# Patient Record
Sex: FEMALE | Race: WHITE | NOT HISPANIC OR LATINO | Employment: STUDENT | URBAN - METROPOLITAN AREA
[De-identification: names, ages, dates, MRNs, and addresses within clinical notes are randomized per-mention and may not be internally consistent; named-entity substitution may affect disease eponyms.]

---

## 2019-11-15 ENCOUNTER — OFFICE VISIT (OUTPATIENT)
Dept: INTERNAL MEDICINE | Facility: CLINIC | Age: 19
End: 2019-11-15

## 2019-11-15 VITALS
BODY MASS INDEX: 22.33 KG/M2 | SYSTOLIC BLOOD PRESSURE: 102 MMHG | HEART RATE: 75 BPM | WEIGHT: 134 LBS | OXYGEN SATURATION: 100 % | HEIGHT: 65 IN | DIASTOLIC BLOOD PRESSURE: 60 MMHG

## 2019-11-15 DIAGNOSIS — F33.0 MILD EPISODE OF RECURRENT MAJOR DEPRESSIVE DISORDER (HCC): ICD-10-CM

## 2019-11-15 DIAGNOSIS — F41.1 GENERALIZED ANXIETY DISORDER: Primary | ICD-10-CM

## 2019-11-15 DIAGNOSIS — Z23 NEED FOR VACCINATION: ICD-10-CM

## 2019-11-15 PROCEDURE — 90471 IMMUNIZATION ADMIN: CPT | Performed by: NURSE PRACTITIONER

## 2019-11-15 PROCEDURE — 99203 OFFICE O/P NEW LOW 30 MIN: CPT | Performed by: NURSE PRACTITIONER

## 2019-11-15 PROCEDURE — 90686 IIV4 VACC NO PRSV 0.5 ML IM: CPT | Performed by: NURSE PRACTITIONER

## 2019-11-15 RX ORDER — PAROXETINE 10 MG/1
10 TABLET, FILM COATED ORAL NIGHTLY
Qty: 30 TABLET | Refills: 0 | Status: SHIPPED | OUTPATIENT
Start: 2019-11-15 | End: 2019-12-16 | Stop reason: SDUPTHER

## 2019-11-15 RX ORDER — HYDROXYZINE HYDROCHLORIDE 25 MG/1
25 TABLET, FILM COATED ORAL 3 TIMES DAILY PRN
Qty: 30 TABLET | Refills: 0 | Status: SHIPPED | OUTPATIENT
Start: 2019-11-15 | End: 2020-08-26 | Stop reason: SDUPTHER

## 2019-11-15 NOTE — PROGRESS NOTES
Chief Complaint   Patient presents with   • Establish Care   • Anxiety   • Depression       History of Present Illness  19 y.o.female presents for establish care new pt and anxiety/depression.  Anxiety depression onset years.  Has taken prozac in past became really nauseated and stopped taking. Has had recent recurrent worsening sx since this summer. Nervous anxious easily upset. Some sadness.  No difficulty sleeping. Sometimes feels like having a panic attack with chest heaviness.  Has been going to counseling but not helping much. Feels like more anxiety than depression. No suicidal thoughts. College student a lot of stress.     Review of Systems   Constitutional: Negative for chills and fever.   Respiratory: Positive for chest tightness. Negative for shortness of breath.    Cardiovascular: Negative for chest pain, palpitations and leg swelling.   Gastrointestinal: Negative for abdominal pain, nausea and vomiting.   Genitourinary: Negative for difficulty urinating.   Neurological: Negative for weakness, numbness and headache.   Psychiatric/Behavioral: Positive for depressed mood and stress. Negative for self-injury, sleep disturbance and suicidal ideas. The patient is nervous/anxious.          [facility]  The following portions of the patient's history were reviewed and updated as appropriate: allergies, current medications, past family history, past medical history, past social history, past surgical history and problem list.     Social hx:  Tobacco none  Alcohol no  Past Medical History:   Diagnosis Date   • Anxiety    • Depression       Past Surgical History:   Procedure Laterality Date   • WISDOM TOOTH EXTRACTION        No Known Allergies   Family History   Problem Relation Age of Onset   • Mental illness Mother    • Mental illness Maternal Grandmother    • Heart attack Maternal Grandfather             Current Outpatient Medications:   •  Drospirenone-Ethinyl Estradiol (JESUS PO), Take  by mouth., Disp: , Rfl:  "    VITALS:  /60   Pulse 75   Ht 165.1 cm (65\")   Wt 60.8 kg (134 lb)   SpO2 100%   BMI 22.30 kg/m²     Physical Exam   Constitutional: She is oriented to person, place, and time. She appears well-developed and well-nourished. No distress.   HENT:   Head: Normocephalic.   Mouth/Throat: Oropharynx is clear and moist.   Eyes: Pupils are equal, round, and reactive to light.   Neck: Normal range of motion. Neck supple. No thyroid mass and no thyromegaly present.   Cardiovascular: Normal rate, regular rhythm and normal heart sounds.   Pulmonary/Chest: Effort normal and breath sounds normal. No respiratory distress.   Lymphadenopathy:     She has no cervical adenopathy.   Neurological: She is alert and oriented to person, place, and time.   Skin: Skin is warm and dry.   Psychiatric: Her speech is normal and behavior is normal. Her mood appears anxious. Cognition and memory are normal. She does not exhibit a depressed mood. She is attentive.   Vitals reviewed.      LABS  No new labs    ASSESSMENT/PLAN  Penelope was seen today for establish care, anxiety and depression.    Diagnoses and all orders for this visit:    Generalized anxiety disorder  -     PARoxetine (PAXIL) 10 MG tablet; Take 1 tablet by mouth Every Night.  -     hydrOXYzine (ATARAX) 25 MG tablet; Take 1 tablet by mouth 3 (Three) Times a Day As Needed for Anxiety.    Mild episode of recurrent major depressive disorder (CMS/HCC)  -     PARoxetine (PAXIL) 10 MG tablet; Take 1 tablet by mouth Every Night.    Need for vaccination  -     Flucelvax Quad=>4Years ([id number])    Counseled patient regarding multimodal approach with healthy nutrition, healthy sleep, regular physical activity, social activities, counseling, and medications.  Reviewed medication options and common side effects. Patient would like to proceed with paxil with hydroxyzine to use prn while waiting for paxil to reach theraputic levels.  Advised some antidepression meds can take " several weeks to see improvement in symptoms, and is important to take medication as prescribed.  Some antidepressants especially in younger populations can worsen depression symptoms.  If any thoughts of self harm or suicidal ideations, pt should contact our office for immediate evaluation or seek emergency care.    I discussed the patients findings and my recommendations with patient.  Patient was encouraged to keep me informed of any acute changes, lack of improvement, or any new concerning symptoms.    Patient voiced understanding of all instructions and denied further questions.      FOLLOW-UP  Return in about 4 weeks (around 12/13/2019), or if symptoms worsen or fail to improve, for Recheck.    Electronically signed by:    JEANNINE Elias  11/15/2019

## 2019-11-15 NOTE — PATIENT INSTRUCTIONS
Pt asked for General medication info for different anxiety/depressant medications:    Hydroxyzine antihistamine; can take every 8 hrs as needed for anxiety; sleepiness side effect. Good for anxiety  Buspar: 2-3 times daily; have to take regularly. Good for anxiety    SSRI anxiety and depression  prozac morning  paxil sleepy take at night  lexapro somulence

## 2019-12-11 DIAGNOSIS — F41.1 GENERALIZED ANXIETY DISORDER: ICD-10-CM

## 2019-12-11 DIAGNOSIS — F33.0 MILD EPISODE OF RECURRENT MAJOR DEPRESSIVE DISORDER (HCC): ICD-10-CM

## 2019-12-12 DIAGNOSIS — F41.1 GENERALIZED ANXIETY DISORDER: ICD-10-CM

## 2019-12-12 DIAGNOSIS — F33.0 MILD EPISODE OF RECURRENT MAJOR DEPRESSIVE DISORDER (HCC): ICD-10-CM

## 2019-12-12 RX ORDER — PAROXETINE 10 MG/1
10 TABLET, FILM COATED ORAL NIGHTLY
Qty: 30 TABLET | Refills: 0 | OUTPATIENT
Start: 2019-12-12

## 2019-12-12 RX ORDER — PAROXETINE 10 MG/1
TABLET, FILM COATED ORAL
Qty: 30 TABLET | Refills: 0 | OUTPATIENT
Start: 2019-12-12

## 2019-12-13 RX ORDER — PAROXETINE 10 MG/1
TABLET, FILM COATED ORAL
Qty: 30 TABLET | Refills: 0 | OUTPATIENT
Start: 2019-12-13

## 2019-12-14 DIAGNOSIS — F33.0 MILD EPISODE OF RECURRENT MAJOR DEPRESSIVE DISORDER (HCC): ICD-10-CM

## 2019-12-14 DIAGNOSIS — F41.1 GENERALIZED ANXIETY DISORDER: ICD-10-CM

## 2019-12-16 DIAGNOSIS — F41.1 GENERALIZED ANXIETY DISORDER: ICD-10-CM

## 2019-12-16 DIAGNOSIS — F33.0 MILD EPISODE OF RECURRENT MAJOR DEPRESSIVE DISORDER (HCC): ICD-10-CM

## 2019-12-16 RX ORDER — PAROXETINE 10 MG/1
10 TABLET, FILM COATED ORAL NIGHTLY
Qty: 3 TABLET | Refills: 0 | Status: SHIPPED | OUTPATIENT
Start: 2019-12-16 | End: 2019-12-19 | Stop reason: SDUPTHER

## 2019-12-16 RX ORDER — PAROXETINE 10 MG/1
10 TABLET, FILM COATED ORAL NIGHTLY
Qty: 30 TABLET | Refills: 0 | OUTPATIENT
Start: 2019-12-16

## 2019-12-19 ENCOUNTER — OFFICE VISIT (OUTPATIENT)
Dept: INTERNAL MEDICINE | Facility: CLINIC | Age: 19
End: 2019-12-19

## 2019-12-19 VITALS
DIASTOLIC BLOOD PRESSURE: 70 MMHG | HEIGHT: 65 IN | HEART RATE: 71 BPM | OXYGEN SATURATION: 100 % | SYSTOLIC BLOOD PRESSURE: 102 MMHG | WEIGHT: 134 LBS | BODY MASS INDEX: 22.33 KG/M2

## 2019-12-19 DIAGNOSIS — F41.1 GENERALIZED ANXIETY DISORDER: ICD-10-CM

## 2019-12-19 PROCEDURE — 99213 OFFICE O/P EST LOW 20 MIN: CPT | Performed by: NURSE PRACTITIONER

## 2019-12-19 RX ORDER — PAROXETINE 10 MG/1
10 TABLET, FILM COATED ORAL NIGHTLY
Qty: 30 TABLET | Refills: 11 | Status: SHIPPED | OUTPATIENT
Start: 2019-12-19 | End: 2020-01-06

## 2019-12-19 NOTE — PROGRESS NOTES
"Chief Complaint   Patient presents with   • Anxiety       History of Present Illness  19 y.o.female presents for anxiety follow up.  Doing well on paxil; likes the med. Feels less anxious. Sleeping better. No side effects. No depression. Has only had to take the hydroxyzine a couple times to help sleep. Onset current anxiety flare few months; started on paxil Nov 11th.    Review of Systems   Constitutional: Negative for chills and fever.   Cardiovascular: Negative for chest pain and palpitations.   Psychiatric/Behavioral: Negative for dysphoric mood, self-injury, sleep disturbance and suicidal ideas. The patient is not nervous/anxious.          [facility]  The following portions of the patient's history were reviewed and updated as appropriate: allergies, current medications, past family history, past medical history, past social history, past surgical history and problem list.       Past Medical History:   Diagnosis Date   • Anxiety    • Depression       Past Surgical History:   Procedure Laterality Date   • WISDOM TOOTH EXTRACTION        No Known Allergies   Family History   Problem Relation Age of Onset   • Mental illness Mother    • Mental illness Maternal Grandmother    • Heart attack Maternal Grandfather             Current Outpatient Medications:   •  Drospirenone-Ethinyl Estradiol (JESUS PO), Take  by mouth., Disp: , Rfl:   •  hydrOXYzine (ATARAX) 25 MG tablet, Take 1 tablet by mouth 3 (Three) Times a Day As Needed for Anxiety., Disp: 30 tablet, Rfl: 0  •  PARoxetine (PAXIL) 10 MG tablet, Take 1 tablet by mouth Every Night., Disp: 3 tablet, Rfl: 0    VITALS:  /70   Pulse 71   Ht 165.1 cm (65\")   Wt 60.8 kg (134 lb)   SpO2 100%   BMI 22.30 kg/m²     Physical Exam   Constitutional: She appears well-developed and well-nourished.   Eyes: Pupils are equal, round, and reactive to light.   Pulmonary/Chest: Effort normal.   Neurological: She is alert.   Psychiatric: She has a normal mood and affect. Her " behavior is normal.   Vitals reviewed.      LABS  No new labs    ASSESSMENT/PLAN  Penelope was seen today for anxiety.    Diagnoses and all orders for this visit:    Generalized anxiety disorder  -     PARoxetine (PAXIL) 10 MG tablet; Take 1 tablet by mouth Every Night.    Encouraged healthy nutrition sleep physical activities.  Explained if having to take hydroxyzine still could be that we need to increase paxil; she wants to cont on current dose.  doing well on current med.  Any thoughts of self hair or suicidal ideations to contact me or seek emergency services.    I discussed the patients findings and my recommendations with patient.  Patient was encouraged to keep me informed of any acute changes, lack of improvement, or any new concerning symptoms.    Patient voiced understanding of all instructions and denied further questions.      FOLLOW-UP  Return in about 1 year (around 12/19/2020), or if symptoms worsen or fail to improve, for Annual.    Electronically signed by:    JEANNINE Elias  12/19/2019

## 2020-01-06 RX ORDER — PAROXETINE HYDROCHLORIDE 20 MG/1
20 TABLET, FILM COATED ORAL
Qty: 30 TABLET | Refills: 0 | Status: SHIPPED | OUTPATIENT
Start: 2020-01-06 | End: 2020-02-02 | Stop reason: SDUPTHER

## 2020-02-03 RX ORDER — PAROXETINE HYDROCHLORIDE 20 MG/1
20 TABLET, FILM COATED ORAL
Qty: 12 TABLET | Refills: 0 | Status: SHIPPED | OUTPATIENT
Start: 2020-02-03 | End: 2020-02-13 | Stop reason: SDUPTHER

## 2020-02-03 RX ORDER — PAROXETINE HYDROCHLORIDE 20 MG/1
20 TABLET, FILM COATED ORAL
Qty: 30 TABLET | Refills: 0 | OUTPATIENT
Start: 2020-02-03

## 2020-02-03 NOTE — TELEPHONE ENCOUNTER
Tiera from pharmacy called to check quantity for the following medication:PARoxetine (PAXIL) 20 MG tablet.  [phone]

## 2020-02-13 ENCOUNTER — OFFICE VISIT (OUTPATIENT)
Dept: INTERNAL MEDICINE | Facility: CLINIC | Age: 20
End: 2020-02-13

## 2020-02-13 VITALS
BODY MASS INDEX: 22.33 KG/M2 | HEIGHT: 65 IN | OXYGEN SATURATION: 98 % | DIASTOLIC BLOOD PRESSURE: 60 MMHG | SYSTOLIC BLOOD PRESSURE: 110 MMHG | WEIGHT: 134 LBS | HEART RATE: 69 BPM

## 2020-02-13 DIAGNOSIS — F32.A ANXIETY AND DEPRESSION: Primary | ICD-10-CM

## 2020-02-13 DIAGNOSIS — F41.9 ANXIETY AND DEPRESSION: Primary | ICD-10-CM

## 2020-02-13 PROCEDURE — 99213 OFFICE O/P EST LOW 20 MIN: CPT | Performed by: NURSE PRACTITIONER

## 2020-02-13 RX ORDER — PAROXETINE HYDROCHLORIDE 20 MG/1
20 TABLET, FILM COATED ORAL
Qty: 90 TABLET | Refills: 5 | Status: SHIPPED | OUTPATIENT
Start: 2020-02-13 | End: 2020-05-07 | Stop reason: SDUPTHER

## 2020-02-13 NOTE — PROGRESS NOTES
Chief Complaint   Patient presents with   • Anxiety       History of Present Illness  19 y.o.female presents for anxiety 4 week follow up to evaluate medication adjustment.    Anxiety   Presents for follow-up visit. Patient reports no compulsions, confusion, decreased concentration, depressed mood, dizziness, dry mouth, excessive worry, feeling of choking, hyperventilation, irritability, nausea, nervous/anxious behavior, palpitations or panic. Primary symptoms comment: Symptoms are improved significantly; only mild . Symptoms occur rarely. The severity of symptoms is mild. The quality of sleep is good. Nighttime awakenings: none.     Compliance with medications is %.         Review of Systems   Constitutional: Negative for irritability.   Cardiovascular: Negative for palpitations.   Gastrointestinal: Negative for nausea.   Neurological: Negative for dizziness and confusion.   Psychiatric/Behavioral: Negative for decreased concentration and depressed mood. The patient is not nervous/anxious.          [facility]  The following portions of the patient's history were reviewed and updated as appropriate: allergies, current medications, past family history, past medical history, past social history, past surgical history and problem list.     Past Medical History:   Diagnosis Date   • Anxiety    • Depression       Past Surgical History:   Procedure Laterality Date   • WISDOM TOOTH EXTRACTION        No Known Allergies   Social History     Socioeconomic History   • Marital status: Single     Spouse name: Not on file   • Number of children: Not on file   • Years of education: Not on file   • Highest education level: Not on file   Tobacco Use   • Smoking status: Never Smoker   • Smokeless tobacco: Never Used   Substance and Sexual Activity   • Alcohol use: Yes   • Drug use: No   • Sexual activity: Yes     Partners: Male     Birth control/protection: OCP     Family History   Problem Relation Age of Onset   • Mental illness  "Mother    • Mental illness Maternal Grandmother    • Heart attack Maternal Grandfather             Current Outpatient Medications:   •  Drospirenone-Ethinyl Estradiol (JESUS PO), Take  by mouth., Disp: , Rfl:   •  hydrOXYzine (ATARAX) 25 MG tablet, Take 1 tablet by mouth 3 (Three) Times a Day As Needed for Anxiety., Disp: 30 tablet, Rfl: 0  •  PARoxetine (PAXIL) 20 MG tablet, Take 1 tablet by mouth every night at bedtime., Disp: 12 tablet, Rfl: 0    VITALS:  /60   Pulse 69   Ht 165.1 cm (65\")   Wt 60.8 kg (134 lb)   SpO2 98%   BMI 22.30 kg/m²     Physical Exam   Constitutional: She appears well-developed and well-nourished. No distress.   HENT:   Head: Normocephalic and atraumatic.   Cardiovascular: Normal rate, regular rhythm and normal heart sounds.   Pulmonary/Chest: Effort normal and breath sounds normal. No respiratory distress.   Skin: Skin is warm and dry.   Psychiatric: She has a normal mood and affect. Her behavior is normal.       LABS  No results found for this or any previous visit.    ASSESSMENT/PLAN  Penelope was seen today for anxiety.    Diagnoses and all orders for this visit:    Anxiety and depression  Comments:  Continue taking meds as RX'd. If any thoughts of self harm or SI seek emergency care.   Orders:  -     PARoxetine (PAXIL) 20 MG tablet; Take 1 tablet by mouth every night at bedtime.    on birth control.  Discussed if wanting to pursue pregnancy she needs to notify me so we could discuss other med options.    I discussed the patients findings and my recommendations with patient.  Patient was encouraged to keep me informed of any acute changes, lack of improvement, or any new concerning symptoms.  Patient voiced understanding of all instructions and denied further questions.      FOLLOW-UP  Return in about 6 months (around 8/13/2020) for Annual.    Electronically signed by:    JEANNINE Elias  02/13/2020    EMR Dragon/Transcription Disclaimer:  Much of this encounter " note is an electronic transcription/translation of spoken language to printed text.  The electronic translation of spoken language may permit erroneous, or at times, nonsensical words or phrases to be inadvertently transcribed.  Although I have reviewed the note for such errors, some may still exist

## 2020-05-07 DIAGNOSIS — F32.A ANXIETY AND DEPRESSION: ICD-10-CM

## 2020-05-07 DIAGNOSIS — F41.9 ANXIETY AND DEPRESSION: ICD-10-CM

## 2020-05-08 RX ORDER — PAROXETINE HYDROCHLORIDE 20 MG/1
20 TABLET, FILM COATED ORAL
Qty: 90 TABLET | Refills: 1 | Status: SHIPPED | OUTPATIENT
Start: 2020-05-08 | End: 2020-09-05 | Stop reason: SDUPTHER

## 2020-08-03 DIAGNOSIS — F41.9 ANXIETY AND DEPRESSION: ICD-10-CM

## 2020-08-03 DIAGNOSIS — F32.A ANXIETY AND DEPRESSION: ICD-10-CM

## 2020-08-03 DIAGNOSIS — F41.1 GENERALIZED ANXIETY DISORDER: ICD-10-CM

## 2020-08-03 RX ORDER — PAROXETINE HYDROCHLORIDE 20 MG/1
20 TABLET, FILM COATED ORAL
Qty: 90 TABLET | Refills: 1 | OUTPATIENT
Start: 2020-08-03

## 2020-08-03 RX ORDER — HYDROXYZINE HYDROCHLORIDE 25 MG/1
25 TABLET, FILM COATED ORAL 3 TIMES DAILY PRN
Qty: 30 TABLET | Refills: 0 | OUTPATIENT
Start: 2020-08-03

## 2020-08-26 ENCOUNTER — OFFICE VISIT (OUTPATIENT)
Dept: INTERNAL MEDICINE | Facility: CLINIC | Age: 20
End: 2020-08-26

## 2020-08-26 VITALS
OXYGEN SATURATION: 98 % | HEART RATE: 66 BPM | DIASTOLIC BLOOD PRESSURE: 78 MMHG | SYSTOLIC BLOOD PRESSURE: 120 MMHG | WEIGHT: 138.4 LBS | BODY MASS INDEX: 23.03 KG/M2

## 2020-08-26 DIAGNOSIS — F41.1 GENERALIZED ANXIETY DISORDER: ICD-10-CM

## 2020-08-26 DIAGNOSIS — Z30.41 ENCOUNTER FOR SURVEILLANCE OF CONTRACEPTIVE PILLS: ICD-10-CM

## 2020-08-26 DIAGNOSIS — Z00.00 ANNUAL PHYSICAL EXAM: Primary | ICD-10-CM

## 2020-08-26 DIAGNOSIS — E04.9 ENLARGED THYROID: ICD-10-CM

## 2020-08-26 PROCEDURE — 99395 PREV VISIT EST AGE 18-39: CPT | Performed by: NURSE PRACTITIONER

## 2020-08-26 RX ORDER — HYDROXYZINE HYDROCHLORIDE 25 MG/1
25 TABLET, FILM COATED ORAL 3 TIMES DAILY PRN
Qty: 30 TABLET | Refills: 11 | Status: SHIPPED | OUTPATIENT
Start: 2020-08-26

## 2020-08-26 RX ORDER — DROSPIRENONE AND ETHINYL ESTRADIOL 0.02-3(28)
1 KIT ORAL DAILY
Qty: 28 TABLET | Refills: 12 | Status: SHIPPED | OUTPATIENT
Start: 2020-08-26 | End: 2021-09-17

## 2020-08-26 NOTE — PROGRESS NOTES
Chief Complaint   Patient presents with   • Annual Exam       History of Present Illness  19 y.o.female presents for health maintanence, Adult Female:   General Health: The patient's health is described as good. She has regular dental visits. She denies vision problems. She denies hearing loss. Immunizations status reviewed and plan to update today if needed.  Chronic medical problems:  General anxiety chronic onset months takes paxil and hydroxyzine without difficulties; sx controlled.  Contraception: takes zachary li bc. Without difficulties.  Non smoker. No hx dvt. No migraines.  Social History/Lifestyle: She is single.   She has never smoked. She reports never drinking alcohol. Denies any illicit drug use.  She consumes a diverse and healthy diet. She does not have any weight concerns. She exercises regularly.    Reproductive health:  She reports normal menses. No vaginal pain, vaginal drainage, pelvic pain, flank pain, or dysuria.  No breast complaints.  Screening:   Health Maintenance reviewed.  Health Maintenance   Topic Date Due   • HPV VACCINES (1 - Female 2-dose series) 11/05/2010   • HEPATITIS C SCREENING  11/11/2019   • CHLAMYDIA SCREENING  11/11/2019   • INFLUENZA VACCINE  08/01/2020   • ANNUAL PHYSICAL  02/14/2021   • TDAP/TD VACCINES (2 - Td) 05/17/2021   • MENINGOCOCCAL VACCINE (Normal Risk)  Completed          Review of Systems   Constitutional: Negative for chills and fatigue.   HENT: Negative for congestion, postnasal drip and rhinorrhea.    Eyes: Negative for blurred vision and visual disturbance.   Respiratory: Negative for cough and shortness of breath.    Cardiovascular: Negative for chest pain, palpitations and leg swelling.   Gastrointestinal: Negative for constipation, diarrhea, nausea and vomiting.   Genitourinary: Negative for difficulty urinating.   Musculoskeletal: Negative for arthralgias.   Skin: Negative for rash.   Neurological: Negative for dizziness and headache.    Psychiatric/Behavioral: Negative for depressed mood. The patient is not nervous/anxious.          [facility]  The following portions of the patient's history were reviewed and updated as appropriate: allergies, current medications, past family history, past medical history, past social history, past surgical history and problem list.     Past Medical History:   Diagnosis Date   • Anxiety    • Depression       Past Surgical History:   Procedure Laterality Date   • WISDOM TOOTH EXTRACTION        No Known Allergies   Family History   Problem Relation Age of Onset   • Mental illness Mother    • Mental illness Maternal Grandmother    • Heart attack Maternal Grandfather       Social History     Socioeconomic History   • Marital status: Single   Tobacco Use   • Smoking status: Never Smoker   • Smokeless tobacco: Never Used   Substance and Sexual Activity   • Alcohol use: Yes   • Drug use: No   • Sexual activity: Yes     Partners: Male     Birth control/protection: OCP         Current Outpatient Medications:   •  Drospirenone-Ethinyl Estradiol (JESUS PO), Take  by mouth., Disp: , Rfl:   •  hydrOXYzine (ATARAX) 25 MG tablet, Take 1 tablet by mouth 3 (Three) Times a Day As Needed for Anxiety., Disp: 30 tablet, Rfl: 0  •  PARoxetine (Paxil) 20 MG tablet, Take 1 tablet by mouth every night at bedtime., Disp: 90 tablet, Rfl: 1    VITALS:  /78   Pulse 66   Wt 62.8 kg (138 lb 6.4 oz)   SpO2 98%   Breastfeeding No   BMI 23.03 kg/m²     Physical Exam   Constitutional: She is oriented to person, place, and time. She appears well-developed and well-nourished. No distress.   HENT:   Head: Normocephalic.   Right Ear: External ear normal.   Left Ear: External ear normal.   Nose: Nose normal.   Mouth/Throat: Oropharynx is clear and moist.   Eyes: Pupils are equal, round, and reactive to light. Conjunctivae and EOM are normal. Right eye exhibits no discharge. Left eye exhibits no discharge.   Neck: Normal range of motion. Neck  supple. Thyromegaly present.   Thyroid gland enlarged bilaterally smooth nontender   Cardiovascular: Normal rate, regular rhythm, normal heart sounds and intact distal pulses.   No edema   Pulmonary/Chest: Effort normal and breath sounds normal. No respiratory distress.   Abdominal: Soft. Bowel sounds are normal. There is no tenderness.   Musculoskeletal: Normal range of motion.   Normal ROM all major joints   Lymphadenopathy:     She has no cervical adenopathy.   Neurological: She is alert and oriented to person, place, and time.   Skin: Skin is warm and dry. Capillary refill takes less than 2 seconds. No rash noted.   Psychiatric: She has a normal mood and affect. Her behavior is normal.   Vitals reviewed.      LABS  pending    ASSESSMENT/PLAN  Penelope was seen today for annual exam.    Diagnoses and all orders for this visit:    Annual physical exam  Comments:  pt declined other screening labwork  Orders:  -     Chlamydia trachomatis, PCR - Urine, Urine, Clean Catch; Future    Generalized anxiety disorder  -     hydrOXYzine (ATARAX) 25 MG tablet; Take 1 tablet by mouth 3 (Three) Times a Day As Needed for Anxiety.    Encounter for surveillance of contraceptive pills  -     drospirenone-ethinyl estradiol (Gisselle) 3-0.02 MG per tablet; Take 1 tablet by mouth Daily.    Enlarged thyroid  -     TSH; Future  -     T4, free; Future  -     Thyroid Peroxidase Antibody; Future        Nutrition and activity goals reviewed including: mainly water to drink, limit white flour/processed sugar; increase high protein, high fiber carbs, good breakfast, working toward 150 mins cardio per week, resistance training 2x/week.    The patient is here for a health maintenance visit.  Currently, the patient consumes a healthy diet and has an adequate exercise regimen. Screening lab work is ordered.  Immunizations are reported as current.  Advice and education is given regarding nutrition, aerobic exercise, routine dental evaluations,  routine eye exams, reproductive health, cardiovascular risk reduction, sunscreen use, and seat belt use (general overall safety).  Further recommendations after lab evaluation.  Annual wellness evaluations recommended.     I discussed the patients findings and my recommendations with patient.  Patient was encouraged to keep me informed of any acute changes or any new concerning symptoms.    Patient voiced understanding of all instructions and denied further questions.      FOLLOW-UP  Return in about 1 year (around 8/26/2021), or if symptoms worsen or fail to improve, for Annual.    Electronically signed by:    JEANNINE Elias  08/26/2020    EMR Dragon/Transcription Disclaimer:  Much of this encounter note is an electronic transcription/translation of spoken language to printed text.  The electronic translation of spoken language may permit erroneous, or at times, nonsensical words or phrases to be inadvertently transcribed.  Although I have reviewed the note for such errors, some may still exist

## 2020-08-27 ENCOUNTER — LAB (OUTPATIENT)
Dept: LAB | Facility: HOSPITAL | Age: 20
End: 2020-08-27

## 2020-08-27 DIAGNOSIS — Z00.00 ANNUAL PHYSICAL EXAM: ICD-10-CM

## 2020-08-27 PROCEDURE — 87491 CHLMYD TRACH DNA AMP PROBE: CPT | Performed by: NURSE PRACTITIONER

## 2020-08-29 LAB — C TRACH RRNA SPEC DONR QL NAA+PROBE: NEGATIVE

## 2020-09-01 ENCOUNTER — TELEPHONE (OUTPATIENT)
Dept: INTERNAL MEDICINE | Facility: CLINIC | Age: 20
End: 2020-09-01

## 2020-09-01 NOTE — TELEPHONE ENCOUNTER
I called pt and LM to return my call. HUB please transfer to office staff to see if I am available; in office today Tuesday.      ----- Message from Carmenza Zavala sent at 8/31/2020  8:13 AM EDT -----  Regarding: FW: Prescription Question  Contact: [phone]      ----- Message -----  From: Penelope Chavez  Sent: 8/30/2020  10:23 AM EDT  To: [facility] [place] [facility]  Subject: Prescription Question                            I have been feeling a little more down than usual and have been considering going up to 40 mg of Paxil if that's okay with you

## 2020-09-02 NOTE — TELEPHONE ENCOUNTER
Patient returned phone call. I let her know Kamilah wasn't in the office today but I will send a message back to see if anyone else could help her.     You can reach her at [phone]   [place]

## 2020-09-05 ENCOUNTER — OFFICE VISIT (OUTPATIENT)
Dept: INTERNAL MEDICINE | Facility: CLINIC | Age: 20
End: 2020-09-05

## 2020-09-05 DIAGNOSIS — F33.0 MILD EPISODE OF RECURRENT MAJOR DEPRESSIVE DISORDER (HCC): ICD-10-CM

## 2020-09-05 PROCEDURE — 99442 PR PHYS/QHP TELEPHONE EVALUATION 11-20 MIN: CPT | Performed by: NURSE PRACTITIONER

## 2020-09-05 RX ORDER — PAROXETINE HYDROCHLORIDE 20 MG/1
30 TABLET, FILM COATED ORAL
Qty: 135 TABLET | Refills: 1 | Status: SHIPPED | OUTPATIENT
Start: 2020-09-05 | End: 2021-04-16 | Stop reason: SDUPTHER

## 2020-09-05 NOTE — PROGRESS NOTES
Chief Complaint   Patient presents with   • Depression     wants to discuss dosage change,Paxil       History of Present Illness  19 y.o.female presents for depression.  You have chosen to receive care through a telephone visit. Do you consent to use a telephone visit for your medical care today? Yes      Depression   Visit Type: follow-up  Patient presents with the following symptoms: depressed mood.  Patient is not experiencing: feelings of hopelessness, feelings of worthlessness, insomnia, nervousness/anxiety, suicidal ideas, suicidal planning and thoughts of death.  Episode frequency: not as bad as when initially started tx, but has been feeling more down lately with everything going on.   Severity: mild   Sleep quality: good  Compliance with medications:  % (takes paxil; would like to increase dose.)              Review of Systems   Constitutional: Negative for fatigue.   Psychiatric/Behavioral: Positive for depressed mood. Negative for self-injury, sleep disturbance and suicidal ideas. The patient is not nervous/anxious and does not have insomnia.          [facility]  The following portions of the patient's history were reviewed and updated as appropriate: allergies, current medications, past family history, past medical history, past social history, past surgical history and problem list.     Past Medical History:   Diagnosis Date   • Anxiety    • Depression       Past Surgical History:   Procedure Laterality Date   • WISDOM TOOTH EXTRACTION        No Known Allergies   Social History     Socioeconomic History   • Marital status: Single   Tobacco Use   • Smoking status: Never Smoker   • Smokeless tobacco: Never Used   Substance and Sexual Activity   • Alcohol use: Yes   • Drug use: No   • Sexual activity: Yes     Partners: Male     Birth control/protection: OCP     Family History   Problem Relation Age of Onset   • Mental illness Mother    • Mental illness Maternal Grandmother    • Heart attack Maternal  Grandfather             Current Outpatient Medications:   •  drospirenone-ethinyl estradiol (Gisselle) 3-0.02 MG per tablet, Take 1 tablet by mouth Daily., Disp: 28 tablet, Rfl: 12  •  hydrOXYzine (ATARAX) 25 MG tablet, Take 1 tablet by mouth 3 (Three) Times a Day As Needed for Anxiety., Disp: 30 tablet, Rfl: 11  •  PARoxetine (Paxil) 20 MG tablet, Take 1 tablet by mouth every night at bedtime., Disp: 90 tablet, Rfl: 1    VITALS:  Physical Exam  Telephone visit  Conversant speech clear appropriate  LABS  No new labs      ASSESSMENT/PLAN  Penelope was seen today for depression.    Diagnoses and all orders for this visit:    Mild episode of recurrent major depressive disorder (CMS/HCC)  Comments:  increased paxil   Orders:  -     PARoxetine (Paxil) 20 MG tablet; Take 1.5 tablets by mouth every night at bedtime.    Depression plan  Diagnostics to rule out other conditions TSH, urine drug screen, EKGs before initiating tricyclic antidepressants  Vitamin D and B12 and folate levels.  Non-pharmacologic management  Establish safe environment ensure patient safety and least restrictive environment.  Psychotherapy nonpharmacologic management  SSRIs or SNRIs, tricyclic antidepressants  In 2 weeks or sooner.  Antidepressant medications continued for at least 4 to 6 months after complete remission of symptoms.  Taper instead of abruptly discontinuing.    Counseled patient regarding multimodal approach with healthy nutrition, healthy sleep, regular physical activity, social activities, counseling, and medications.  Reviewed medication options and common side effects. Patient would like to proceed with increasing paxil.  Advised some antidepression meds can take several weeks to see improvement in symptoms, and is important to take medication as prescribed.  Some antidepressants especially in younger populations can worsen depression symptoms.  If any thoughts of self harm or suicidal ideations, pt should contact our office for  immediate evaluation or seek emergency care.      I discussed the patients findings and my recommendations with patient.  Patient was encouraged to keep me informed of any acute changes, lack of improvement, or any new concerning symptoms.  Patient voiced understanding of all instructions and denied further questions.  This visit has been rescheduled as a phone visit to comply with patient safety concerns in accordance with CDC recommendations. Total time of discussion was 12 minutes.      FOLLOW-UP  Return in about 3 months (around 12/5/2020), or if symptoms worsen or fail to improve, for Recheck.    Electronically signed by:    JEANNINE Elias  09/05/2020    EMR Dragon/Transcription Disclaimer:  Much of this encounter note is an electronic transcription/translation of spoken language to printed text.  The electronic translation of spoken language may permit erroneous, or at times, nonsensical words or phrases to be inadvertently transcribed.  Although I have reviewed the note for such errors, some may still exist

## 2020-12-29 DIAGNOSIS — F33.0 MILD EPISODE OF RECURRENT MAJOR DEPRESSIVE DISORDER (HCC): ICD-10-CM

## 2020-12-30 DIAGNOSIS — F33.0 MILD EPISODE OF RECURRENT MAJOR DEPRESSIVE DISORDER (HCC): ICD-10-CM

## 2020-12-30 RX ORDER — PAROXETINE HYDROCHLORIDE 20 MG/1
30 TABLET, FILM COATED ORAL
Qty: 135 TABLET | Refills: 1 | OUTPATIENT
Start: 2020-12-30

## 2020-12-30 NOTE — TELEPHONE ENCOUNTER
"HUB to read \"Pt to contact pharmacy, should still have rf available, sent on 9/5/20 for 6 month supply\"  "

## 2021-03-22 ENCOUNTER — PATIENT MESSAGE (OUTPATIENT)
Dept: INTERNAL MEDICINE | Facility: CLINIC | Age: 21
End: 2021-03-22

## 2021-03-23 ENCOUNTER — TELEPHONE (OUTPATIENT)
Dept: INTERNAL MEDICINE | Facility: CLINIC | Age: 21
End: 2021-03-23

## 2021-03-23 NOTE — TELEPHONE ENCOUNTER
----- Message from Penelope Chavez sent at 3/23/2021  4:17 PM EDT -----  Regarding: RE: Appt  Contact: [phone]  What number do I call?     ----- Message -----  From: ROSE PATEL  Sent: 3/22/21, 1:01 PM  To: Penelopegeovani Chavez  Subject: Appt    Ms. Chavez  Please call and schedule a video visit or telephone visit w/ Kamilah to discuss further. Thank you

## 2021-03-25 ENCOUNTER — OFFICE VISIT (OUTPATIENT)
Dept: INTERNAL MEDICINE | Facility: CLINIC | Age: 21
End: 2021-03-25

## 2021-03-25 DIAGNOSIS — Z30.09 BIRTH CONTROL COUNSELING: Primary | ICD-10-CM

## 2021-03-25 PROCEDURE — 99443 PR PHYS/QHP TELEPHONE EVALUATION 21-30 MIN: CPT | Performed by: NURSE PRACTITIONER

## 2021-03-25 NOTE — PROGRESS NOTES
Chief Complaint   Patient presents with   • Contraception     Pt to discuss birth control     You have chosen to receive care through a telephone visit. Do you consent to use a telephone visit for your medical care today? Yes    History of Present Illness    20 y.o.female presents for contraception couneling.  On pill since high school was on accutane. Her friends report feeling better off bc. Would like to discuss more natural ways for bc so can take less medication. Would like to see if helped anx/depression.     ROS: any positive referenced above.   Other ROS negative.      [facility]  The following portions of the patient's history were reviewed and updated as appropriate: allergies, current medications, past family history, past medical history, past social history, past surgical history and problem list.     Past Medical History:   Diagnosis Date   • Anxiety    • Depression       No Known Allergies   Social History     Tobacco Use   • Smoking status: Never Smoker   • Smokeless tobacco: Never Used   Substance Use Topics   • Alcohol use: Yes   • Drug use: No         Current Outpatient Medications:   •  drospirenone-ethinyl estradiol (Gisselle) 3-0.02 MG per tablet, Take 1 tablet by mouth Daily., Disp: 28 tablet, Rfl: 12  •  hydrOXYzine (ATARAX) 25 MG tablet, Take 1 tablet by mouth 3 (Three) Times a Day As Needed for Anxiety., Disp: 30 tablet, Rfl: 11  •  PARoxetine (Paxil) 20 MG tablet, Take 1.5 tablets by mouth every night at bedtime., Disp: 135 tablet, Rfl: 1    Physical Exam  telehealth    Assessment and Plan    Diagnoses and all orders for this visit:    1. Birth control counseling (Primary)    discussed different forms risks benefits bc; lower dose NELLI, POP, injections, implants, inserts, and condoms. Pt would like to continue to think about options and will let me know if any questions or if needs change in prescriptions. No further questions.      Follow Up   No follow-ups on file.    This visit has been  rescheduled as a phone visit to comply with patient safety concerns in accordance with CDC recommendations. Total time of discussion was 21 minutes.        I discussed the patients findings and my recommendations with patient.  Patient was encouraged to keep me informed of any acute changes, lack of improvement, or any new concerning symptoms.  Patient voiced understanding of all instructions and denied further questions.    Electronically signed by:    JEANNINE Elias  03/25/2021    EMR Dragon/Transcription Disclaimer:  Much of this encounter note is an electronic transcription/translation of spoken language to printed text.  The electronic translation of spoken language may permit erroneous, or at times, nonsensical words or phrases to be inadvertently transcribed.  Although I have reviewed the note for such errors, some may still exist

## 2021-04-16 ENCOUNTER — TELEMEDICINE (OUTPATIENT)
Dept: INTERNAL MEDICINE | Facility: CLINIC | Age: 21
End: 2021-04-16

## 2021-04-16 DIAGNOSIS — F33.0 MILD EPISODE OF RECURRENT MAJOR DEPRESSIVE DISORDER (HCC): Primary | ICD-10-CM

## 2021-04-16 DIAGNOSIS — F41.9 ANXIETY: ICD-10-CM

## 2021-04-16 PROCEDURE — 99213 OFFICE O/P EST LOW 20 MIN: CPT | Performed by: NURSE PRACTITIONER

## 2021-04-16 RX ORDER — PAROXETINE HYDROCHLORIDE 40 MG/1
40 TABLET, FILM COATED ORAL
Qty: 30 TABLET | Refills: 3 | Status: SHIPPED | OUTPATIENT
Start: 2021-04-16 | End: 2021-07-10 | Stop reason: SDUPTHER

## 2021-04-16 NOTE — PROGRESS NOTES
Chief Complaint   Patient presents with   • Depression     The use of a video visit has been reviewed with the patient and verbal informed consent has been obtained.      History of Present Illness    20 y.o.female presents for depression follow-up.    Depression  Visit Type: follow-up  Patient presents with the following symptoms: depressed mood, nervousness/anxiety and panic.  Patient is not experiencing: feelings of hopelessness, feelings of worthlessness, hypersomnia, insomnia, shortness of breath, suicidal ideas and thoughts of death.  Frequency of symptoms: occasionally   Severity: mild   Sleep quality: good      Intermittent anxiety chronic onset months recurrent.  Takes occasional hydroxyzine for anxiety and does well for that.  Patient would like to try increasing her Paxil dose to see if further helps her intermittent depression problem.    Review of Systems   Constitutional: Negative for chills, fatigue and fever.   Respiratory: Negative for shortness of breath.    Cardiovascular: Negative for chest pain.   Gastrointestinal: Negative for nausea.   Neurological: Negative for dizziness and light-headedness.   Psychiatric/Behavioral: Positive for depressed mood. Negative for self-injury, sleep disturbance and suicidal ideas. The patient is nervous/anxious. The patient does not have insomnia.            [facility]  The following portions of the patient's history were reviewed and updated as appropriate: allergies, current medications, past family history, past medical history, past social history, past surgical history and problem list.     Past Medical History:   Diagnosis Date   • Anxiety    • Depression       No Known Allergies   Social History     Tobacco Use   • Smoking status: Never Smoker   • Smokeless tobacco: Never Used   Substance Use Topics   • Alcohol use: Yes   • Drug use: No         Current Outpatient Medications:   •  drospirenone-ethinyl estradiol (Gisselle) 3-0.02 MG per tablet, Take 1 tablet by  mouth Daily., Disp: 28 tablet, Rfl: 12  •  hydrOXYzine (ATARAX) 25 MG tablet, Take 1 tablet by mouth 3 (Three) Times a Day As Needed for Anxiety., Disp: 30 tablet, Rfl: 11  •  PARoxetine (Paxil) 20 MG tablet, Take 1.5 tablets by mouth every night at bedtime., Disp: 135 tablet, Rfl: 1      Physical Exam  HENT:      Head: Normocephalic.   Pulmonary:      Effort: Pulmonary effort is normal.   Neurological:      General: No focal deficit present.      Mental Status: She is alert and oriented to person, place, and time.   Psychiatric:         Mood and Affect: Mood normal.         Behavior: Behavior normal.      Comments: talkative           Assessment and Plan    Diagnoses and all orders for this visit:    1. Mild episode of recurrent major depressive disorder (CMS/HCC) (Primary)  Comments:  increased paxil   Orders:  -     PARoxetine (Paxil) 40 MG tablet; Take 1 tablet by mouth every night at bedtime.  Dispense: 30 tablet; Refill: 3    2. Anxiety  Comments:  Continue Paxil and as needed hydroxyzine.          Follow Up   Return in about 2 months (around 6/16/2021), or if symptoms worsen or fail to improve, for Recheck.      I discussed the patients findings and my recommendations with patient.  Patient was encouraged to keep me informed of any acute changes, lack of improvement, or any new concerning symptoms.  Patient voiced understanding of all instructions and denied further questions.    Electronically signed by:    JEANNINE Elias  04/16/2021    EMR Dragon/Transcription Disclaimer:  Much of this encounter note is an electronic transcription/translation of spoken language to printed text.  The electronic translation of spoken language may permit erroneous, or at times, nonsensical words or phrases to be inadvertently transcribed.  Although I have reviewed the note for such errors, some may still exist

## 2021-07-10 ENCOUNTER — OFFICE VISIT (OUTPATIENT)
Dept: INTERNAL MEDICINE | Facility: CLINIC | Age: 21
End: 2021-07-10

## 2021-07-10 VITALS
OXYGEN SATURATION: 100 % | WEIGHT: 142.8 LBS | SYSTOLIC BLOOD PRESSURE: 108 MMHG | DIASTOLIC BLOOD PRESSURE: 82 MMHG | HEIGHT: 65 IN | BODY MASS INDEX: 23.79 KG/M2 | HEART RATE: 80 BPM | TEMPERATURE: 97.6 F

## 2021-07-10 DIAGNOSIS — R41.840 ATTENTION DEFICIT: Primary | ICD-10-CM

## 2021-07-10 DIAGNOSIS — F33.0 MILD EPISODE OF RECURRENT MAJOR DEPRESSIVE DISORDER (HCC): ICD-10-CM

## 2021-07-10 PROCEDURE — 99214 OFFICE O/P EST MOD 30 MIN: CPT | Performed by: NURSE PRACTITIONER

## 2021-07-10 RX ORDER — PAROXETINE HYDROCHLORIDE 40 MG/1
40 TABLET, FILM COATED ORAL
Qty: 90 TABLET | Refills: 3 | Status: SHIPPED | OUTPATIENT
Start: 2021-07-10 | End: 2022-06-23 | Stop reason: SDUPTHER

## 2021-07-10 RX ORDER — ALBUTEROL SULFATE 90 UG/1
AEROSOL, METERED RESPIRATORY (INHALATION)
COMMUNITY
Start: 2021-05-02 | End: 2021-09-17

## 2021-07-10 RX ORDER — ATOMOXETINE 40 MG/1
CAPSULE ORAL
Qty: 90 CAPSULE | Refills: 0 | Status: SHIPPED | OUTPATIENT
Start: 2021-07-10 | End: 2021-09-17 | Stop reason: SDUPTHER

## 2021-07-10 NOTE — PROGRESS NOTES
Chief Complaint   Patient presents with   • ADHD     wants to discuss med to help focus more       History of Present Illness    20 y.o.female presents for attention deficit. Wants to discuss med to help focus more. Is on paxil for depression; improved symptoms but still not able to focus well. Has difficulty taking tests and other life activities. Has never had to take any ADD meds before.    Depression chronic; onset > 1 year. taking paxil. Improved symptoms tolerating well.    Review of Systems   Constitutional: Negative for fatigue.   Psychiatric/Behavioral: Positive for decreased concentration. Negative for depressed mood. The patient is not nervous/anxious.        [facility]  The following portions of the patient's history were reviewed and updated as appropriate: allergies, current medications, past family history, past medical history, past social history, past surgical history and problem list.     Past Medical History:   Diagnosis Date   • Anxiety    • Depression       No Known Allergies   Social History     Tobacco Use   • Smoking status: Never Smoker   • Smokeless tobacco: Never Used   Vaping Use   • Vaping Use: Never used   Substance Use Topics   • Alcohol use: Yes   • Drug use: No     Past Surgical History:   Procedure Laterality Date   • WISDOM TOOTH EXTRACTION        Family History   Problem Relation Age of Onset   • Mental illness Mother    • Mental illness Maternal Grandmother    • Heart attack Maternal Grandfather            Current Outpatient Medications:   •  hydrOXYzine (ATARAX) 25 MG tablet, Take 1 tablet by mouth 3 (Three) Times a Day As Needed for Anxiety., Disp: 30 tablet, Rfl: 11  •  PARoxetine (Paxil) 40 MG tablet, Take 1 tablet by mouth every night at bedtime., Disp: 30 tablet, Rfl: 3  •  albuterol sulfate  (90 Base) MCG/ACT inhaler, INHALE 2 PUFFS BY MOUTH EVERY 4 TO 6 HOURS AS NEEDED FOR WHEEZING, Disp: , Rfl:   •  drospirenone-ethinyl estradiol (Gisselle) 3-0.02 MG per tablet, Take  "1 tablet by mouth Daily., Disp: 28 tablet, Rfl: 12    VITALS:  /82   Pulse 80   Temp 97.6 °F (36.4 °C)   Ht 165.1 cm (65\")   Wt 64.8 kg (142 lb 12.8 oz)   SpO2 100%   BMI 23.76 kg/m²     Physical Exam  Vitals reviewed.   Constitutional:       General: She is not in acute distress.     Appearance: She is well-developed.   HENT:      Head: Normocephalic.   Eyes:      Pupils: Pupils are equal, round, and reactive to light.   Cardiovascular:      Rate and Rhythm: Normal rate and regular rhythm.      Heart sounds: Normal heart sounds.      Comments: No edema  Pulmonary:      Effort: Pulmonary effort is normal. No respiratory distress.      Breath sounds: Normal breath sounds.   Musculoskeletal:      Cervical back: Normal range of motion.   Skin:     General: Skin is warm and dry.      Capillary Refill: Capillary refill takes less than 2 seconds.      Findings: No rash.   Neurological:      Mental Status: She is alert and oriented to person, place, and time.   Psychiatric:         Mood and Affect: Mood normal.         Behavior: Behavior normal.           Assessment and Plan    Diagnoses and all orders for this visit:    1. Attention deficit (Primary)  -     atomoxetine (STRATTERA) 40 MG capsule; Take 1 capsule by mouth daily in mornings for 1 month, then increase to 2 capsules daily in mornings. Need follow up appt at 8 weeks.  Dispense: 90 capsule; Refill: 0    2. Mild episode of recurrent major depressive disorder (CMS/HCC)  Comments:  cotn paxil   Orders:  -     PARoxetine (Paxil) 40 MG tablet; Take 1 tablet by mouth every night at bedtime.  Dispense: 90 tablet; Refill: 3        I discussed the patients findings and my recommendations with patient.  Patient was encouraged to keep me informed of any acute changes, lack of improvement, or any new concerning symptoms.  Patient voiced understanding of all instructions and denied further questions.      Follow Up   Return in about 8 weeks (around 9/4/2021), or " if symptoms worsen or fail to improve.      Electronically signed by:    JEANNINE Elias  07/10/2021

## 2021-09-16 DIAGNOSIS — R41.840 ATTENTION DEFICIT: ICD-10-CM

## 2021-09-16 DIAGNOSIS — F33.0 MILD EPISODE OF RECURRENT MAJOR DEPRESSIVE DISORDER (HCC): ICD-10-CM

## 2021-09-16 RX ORDER — ATOMOXETINE 40 MG/1
CAPSULE ORAL
Qty: 90 CAPSULE | Refills: 0 | Status: CANCELLED | OUTPATIENT
Start: 2021-09-16

## 2021-09-16 RX ORDER — PAROXETINE HYDROCHLORIDE 40 MG/1
40 TABLET, FILM COATED ORAL
Qty: 90 TABLET | Refills: 3 | OUTPATIENT
Start: 2021-09-16

## 2021-09-16 NOTE — TELEPHONE ENCOUNTER
Last Office Visit: 07/10/21  Next Office Visit: 09/17/21    Labs completed in past 6 months? no  Labs completed in past year? no    Strattera  Last Refill Date: 07/10/21  Quantity:90  Refills:0    Pa  Pharmacy:     Please review pended refill request for any changes needed on refills or quantities. Thank you!

## 2021-09-17 ENCOUNTER — OFFICE VISIT (OUTPATIENT)
Dept: INTERNAL MEDICINE | Facility: CLINIC | Age: 21
End: 2021-09-17

## 2021-09-17 VITALS
HEART RATE: 88 BPM | TEMPERATURE: 98.5 F | DIASTOLIC BLOOD PRESSURE: 86 MMHG | BODY MASS INDEX: 22.96 KG/M2 | OXYGEN SATURATION: 100 % | WEIGHT: 138 LBS | SYSTOLIC BLOOD PRESSURE: 130 MMHG

## 2021-09-17 DIAGNOSIS — R41.840 ATTENTION DEFICIT: ICD-10-CM

## 2021-09-17 PROCEDURE — 99213 OFFICE O/P EST LOW 20 MIN: CPT | Performed by: NURSE PRACTITIONER

## 2021-09-17 RX ORDER — ATOMOXETINE 80 MG/1
80 CAPSULE ORAL DAILY
Qty: 60 CAPSULE | Refills: 0 | Status: SHIPPED | OUTPATIENT
Start: 2021-09-17 | End: 2022-07-12

## 2021-09-17 RX ORDER — PAROXETINE HYDROCHLORIDE 40 MG/1
40 TABLET, FILM COATED ORAL
Qty: 90 TABLET | Refills: 3 | Status: CANCELLED | OUTPATIENT
Start: 2021-09-17

## 2021-09-17 RX ORDER — ATOMOXETINE 40 MG/1
CAPSULE ORAL
Qty: 90 CAPSULE | Refills: 0 | Status: CANCELLED | OUTPATIENT
Start: 2021-09-17

## 2021-09-21 NOTE — PROGRESS NOTES
Chief Complaint   Patient presents with   • Med Refill     check dosage on Strattera       History of Present Illness    20 y.o.female presents for fu ADD med refill.  Chronic attention deficit. Seen back in July; started on straterra. Is currently at 80 mg daily and is helping. Wants to stay on current dose.  Depression doing good on paxil.    Review of Systems   Constitutional: Negative for fatigue.   Respiratory: Negative for shortness of breath.    Cardiovascular: Negative for chest pain, palpitations and leg swelling.   Psychiatric/Behavioral: Positive for decreased concentration.         [facility]  The following portions of the patient's history were reviewed and updated as appropriate: allergies, current medications, past family history, past medical history, past social history, past surgical history and problem list.     Past Medical History:   Diagnosis Date   • Anxiety    • Depression       No Known Allergies   Social History     Tobacco Use   • Smoking status: Never Smoker   • Smokeless tobacco: Never Used   Vaping Use   • Vaping Use: Never used   Substance Use Topics   • Alcohol use: Yes   • Drug use: No     Past Surgical History:   Procedure Laterality Date   • WISDOM TOOTH EXTRACTION        Family History   Problem Relation Age of Onset   • Mental illness Mother    • Mental illness Maternal Grandmother    • Heart attack Maternal Grandfather            Current Outpatient Medications:   •  atomoxetine (STRATTERA) 80 MG capsule, Take 1 capsule by mouth Daily., Disp: 60 capsule, Rfl: 0  •  hydrOXYzine (ATARAX) 25 MG tablet, Take 1 tablet by mouth 3 (Three) Times a Day As Needed for Anxiety., Disp: 30 tablet, Rfl: 11  •  PARoxetine (Paxil) 40 MG tablet, Take 1 tablet by mouth every night at bedtime., Disp: 90 tablet, Rfl: 3    VITALS:  /86   Pulse 88   Temp 98.5 °F (36.9 °C)   Wt 62.6 kg (138 lb)   SpO2 100%   BMI 22.96 kg/m²     Physical Exam  HENT:      Head: Normocephalic.   Cardiovascular:       Rate and Rhythm: Normal rate and regular rhythm.      Heart sounds: Normal heart sounds.   Pulmonary:      Effort: Pulmonary effort is normal. No respiratory distress.      Breath sounds: Normal breath sounds.   Neurological:      Mental Status: She is alert and oriented to person, place, and time.   Psychiatric:         Mood and Affect: Mood normal.             Assessment and Plan    Diagnoses and all orders for this visit:    1. Attention deficit  -     atomoxetine (STRATTERA) 80 MG capsule; Take 1 capsule by mouth Daily.  Dispense: 60 capsule; Refill: 0      I discussed the patients findings and my recommendations with patient.  Patient was encouraged to keep me informed of any acute changes, lack of improvement, or any new concerning symptoms.  Patient voiced understanding of all instructions and denied further questions.      Follow Up {Instructions Charge Capture  Follow-up Communications :23}  Return in about 2 months (around 11/17/2021).      Electronically signed by:    JEANNINE Elias  09/17/2021

## 2021-11-12 ENCOUNTER — OFFICE VISIT (OUTPATIENT)
Dept: INTERNAL MEDICINE | Facility: CLINIC | Age: 21
End: 2021-11-12

## 2021-11-12 ENCOUNTER — LAB (OUTPATIENT)
Dept: LAB | Facility: HOSPITAL | Age: 21
End: 2021-11-12

## 2021-11-12 VITALS
WEIGHT: 139.6 LBS | BODY MASS INDEX: 23.23 KG/M2 | DIASTOLIC BLOOD PRESSURE: 80 MMHG | SYSTOLIC BLOOD PRESSURE: 122 MMHG | OXYGEN SATURATION: 99 % | HEART RATE: 122 BPM | TEMPERATURE: 97.8 F

## 2021-11-12 DIAGNOSIS — R00.0 TACHYCARDIA: ICD-10-CM

## 2021-11-12 DIAGNOSIS — R00.0 TACHYCARDIA: Primary | ICD-10-CM

## 2021-11-12 DIAGNOSIS — Z23 NEED FOR INFLUENZA VACCINATION: ICD-10-CM

## 2021-11-12 LAB
BASOPHILS # BLD AUTO: 0.06 10*3/MM3 (ref 0–0.2)
BASOPHILS NFR BLD AUTO: 1.1 % (ref 0–1.5)
DEPRECATED RDW RBC AUTO: 38.6 FL (ref 37–54)
EOSINOPHIL # BLD AUTO: 0.08 10*3/MM3 (ref 0–0.4)
EOSINOPHIL NFR BLD AUTO: 1.4 % (ref 0.3–6.2)
ERYTHROCYTE [DISTWIDTH] IN BLOOD BY AUTOMATED COUNT: 11.7 % (ref 12.3–15.4)
HCT VFR BLD AUTO: 41.4 % (ref 34–46.6)
HGB BLD-MCNC: 14.4 G/DL (ref 12–15.9)
IMM GRANULOCYTES # BLD AUTO: 0.01 10*3/MM3 (ref 0–0.05)
IMM GRANULOCYTES NFR BLD AUTO: 0.2 % (ref 0–0.5)
LYMPHOCYTES # BLD AUTO: 1.6 10*3/MM3 (ref 0.7–3.1)
LYMPHOCYTES NFR BLD AUTO: 29 % (ref 19.6–45.3)
MCH RBC QN AUTO: 31.6 PG (ref 26.6–33)
MCHC RBC AUTO-ENTMCNC: 34.8 G/DL (ref 31.5–35.7)
MCV RBC AUTO: 90.8 FL (ref 79–97)
MONOCYTES # BLD AUTO: 0.48 10*3/MM3 (ref 0.1–0.9)
MONOCYTES NFR BLD AUTO: 8.7 % (ref 5–12)
NEUTROPHILS NFR BLD AUTO: 3.29 10*3/MM3 (ref 1.7–7)
NEUTROPHILS NFR BLD AUTO: 59.6 % (ref 42.7–76)
NRBC BLD AUTO-RTO: 0 /100 WBC (ref 0–0.2)
PLATELET # BLD AUTO: 374 10*3/MM3 (ref 140–450)
PMV BLD AUTO: 10.3 FL (ref 6–12)
RBC # BLD AUTO: 4.56 10*6/MM3 (ref 3.77–5.28)
WBC # BLD AUTO: 5.52 10*3/MM3 (ref 3.4–10.8)

## 2021-11-12 PROCEDURE — 90686 IIV4 VACC NO PRSV 0.5 ML IM: CPT | Performed by: NURSE PRACTITIONER

## 2021-11-12 PROCEDURE — 80053 COMPREHEN METABOLIC PANEL: CPT

## 2021-11-12 PROCEDURE — 84443 ASSAY THYROID STIM HORMONE: CPT

## 2021-11-12 PROCEDURE — 85025 COMPLETE CBC W/AUTO DIFF WBC: CPT

## 2021-11-12 PROCEDURE — 99214 OFFICE O/P EST MOD 30 MIN: CPT | Performed by: NURSE PRACTITIONER

## 2021-11-12 PROCEDURE — 90471 IMMUNIZATION ADMIN: CPT | Performed by: NURSE PRACTITIONER

## 2021-11-12 NOTE — PROGRESS NOTES
Chief Complaint   Patient presents with   • Rapid Heart Rate       History of Present Illness    21 y.o.female presents for fast heart rate.  Over last couple weeks has noticed her heart rate runs high 110 -120's; even when resting. No chest pain. No palpitations or shortness of breath.       Review of Systems   Constitutional: Negative for chills and fever.   HENT: Negative.    Eyes: Negative for blurred vision and visual disturbance.   Respiratory: Negative for chest tightness, shortness of breath and wheezing.    Cardiovascular: Negative for chest pain, palpitations and leg swelling.        Fast heart rate   Genitourinary: Negative for difficulty urinating.   Neurological: Negative for dizziness, light-headedness and headache.         [facility]  The following portions of the patient's history were reviewed and updated as appropriate: allergies, current medications, past family history, past medical history, past social history, past surgical history and problem list.     Past Medical History:   Diagnosis Date   • Anxiety    • Depression       No Known Allergies   Social History     Tobacco Use   • Smoking status: Never Smoker   • Smokeless tobacco: Never Used   Vaping Use   • Vaping Use: Never used   Substance Use Topics   • Alcohol use: Yes   • Drug use: No     Past Surgical History:   Procedure Laterality Date   • WISDOM TOOTH EXTRACTION        Family History   Problem Relation Age of Onset   • Mental illness Mother    • Mental illness Maternal Grandmother    • Heart attack Maternal Grandfather            Current Outpatient Medications:   •  atomoxetine (STRATTERA) 80 MG capsule, Take 1 capsule by mouth Daily., Disp: 60 capsule, Rfl: 0  •  hydrOXYzine (ATARAX) 25 MG tablet, Take 1 tablet by mouth 3 (Three) Times a Day As Needed for Anxiety., Disp: 30 tablet, Rfl: 11  •  PARoxetine (Paxil) 40 MG tablet, Take 1 tablet by mouth every night at bedtime., Disp: 90 tablet, Rfl: 3    VITALS:  /80   Pulse (!) 122    Temp 97.8 °F (36.6 °C)   Wt 63.3 kg (139 lb 9.6 oz)   SpO2 99%   BMI 23.23 kg/m²     Physical Exam  Vitals reviewed.   Cardiovascular:      Rate and Rhythm: Tachycardia present.      Heart sounds: Normal heart sounds.   Pulmonary:      Effort: Pulmonary effort is normal. No respiratory distress.      Breath sounds: Normal breath sounds.   Musculoskeletal:      Right lower leg: No edema.      Left lower leg: No edema.   Skin:     General: Skin is dry.   Neurological:      General: No focal deficit present.      Mental Status: She is alert and oriented to person, place, and time.         Result Review :            Assessment and Plan    Diagnoses and all orders for this visit:    1. Tachycardia (Primary)  -     CBC & Differential; Future  -     Comprehensive Metabolic Panel; Future  -     TSH Rfx On Abnormal To Free T4; Future  Check labs. Discussed use of betablockers; pt prefers to not have to take other medication at this time.  Cut back on caffeine.  2. Need for influenza vaccination  -     FluLaval/Fluarix/Fluzone >6 Months        I discussed the patients findings and my recommendations with patient.  Patient was encouraged to keep me informed of any acute changes, lack of improvement, or any new concerning symptoms.  Patient voiced understanding of all instructions and denied further questions.      Follow Up   Return if symptoms worsen or fail to improve.      Electronically signed by:    JEANNINE Elias  11/12/2021

## 2021-11-13 LAB
ALBUMIN SERPL-MCNC: 4.8 G/DL (ref 3.5–5.2)
ALBUMIN/GLOB SERPL: 2 G/DL
ALP SERPL-CCNC: 86 U/L (ref 39–117)
ALT SERPL W P-5'-P-CCNC: 31 U/L (ref 1–33)
ANION GAP SERPL CALCULATED.3IONS-SCNC: 6.5 MMOL/L (ref 5–15)
AST SERPL-CCNC: 33 U/L (ref 1–32)
BILIRUB SERPL-MCNC: 0.3 MG/DL (ref 0–1.2)
BUN SERPL-MCNC: 9 MG/DL (ref 6–20)
BUN/CREAT SERPL: 10.8 (ref 7–25)
CALCIUM SPEC-SCNC: 9.5 MG/DL (ref 8.6–10.5)
CHLORIDE SERPL-SCNC: 102 MMOL/L (ref 98–107)
CO2 SERPL-SCNC: 27.5 MMOL/L (ref 22–29)
CREAT SERPL-MCNC: 0.83 MG/DL (ref 0.57–1)
GFR SERPL CREATININE-BSD FRML MDRD: 86 ML/MIN/1.73
GLOBULIN UR ELPH-MCNC: 2.4 GM/DL
GLUCOSE SERPL-MCNC: 100 MG/DL (ref 65–99)
POTASSIUM SERPL-SCNC: 4.8 MMOL/L (ref 3.5–5.2)
PROT SERPL-MCNC: 7.2 G/DL (ref 6–8.5)
SODIUM SERPL-SCNC: 136 MMOL/L (ref 136–145)
TSH SERPL DL<=0.05 MIU/L-ACNC: 1.94 UIU/ML (ref 0.27–4.2)

## 2021-11-17 ENCOUNTER — TELEPHONE (OUTPATIENT)
Dept: INTERNAL MEDICINE | Facility: CLINIC | Age: 21
End: 2021-11-17

## 2021-11-17 NOTE — TELEPHONE ENCOUNTER
PT'S PSYCHIATRIST, DR. TRAVIS RANDOLPH, CALLED IN REQUESTING THAT [facility] ORDER PT AN EKG BEFORE THEY CAN PUT HER IN ON A STIMULANT. PLEASE ADVISE.     CALLBACK # [phone]

## 2021-11-21 DIAGNOSIS — R00.0 TACHYCARDIA: Primary | ICD-10-CM

## 2021-11-21 DIAGNOSIS — Z51.81 MEDICATION MONITORING ENCOUNTER: ICD-10-CM

## 2021-11-22 NOTE — TELEPHONE ENCOUNTER
Caller: Penelope Chavez    Relationship to patient: Self    Best call back number: [phone]    Patient is needing: PATIENT CALLED BACK TO PROVIDE FAX NUMBER [phone]

## 2021-11-22 NOTE — TELEPHONE ENCOUNTER
"\"HUB TO READ\" PT CALLING WITH FAX INFORMATION  Called pt and she will call us back with info of where to send the order to have done at [facility] in [place].    "

## 2021-11-22 NOTE — TELEPHONE ENCOUNTER
Call pt. She can stop by for nurse visit to get ekg done. Order is in computer. If she is still having  - 120's, I would not do stimulant medication.

## 2022-06-23 DIAGNOSIS — F33.0 MILD EPISODE OF RECURRENT MAJOR DEPRESSIVE DISORDER: ICD-10-CM

## 2022-06-23 RX ORDER — PAROXETINE HYDROCHLORIDE 40 MG/1
TABLET, FILM COATED ORAL
Qty: 90 TABLET | Refills: 3 | OUTPATIENT
Start: 2022-06-23

## 2022-06-23 RX ORDER — PAROXETINE HYDROCHLORIDE 40 MG/1
40 TABLET, FILM COATED ORAL
Qty: 20 TABLET | Refills: 0 | Status: SHIPPED | OUTPATIENT
Start: 2022-06-23 | End: 2022-07-12 | Stop reason: SDUPTHER

## 2022-06-23 NOTE — TELEPHONE ENCOUNTER
Caller: Penelope Chavez    Relationship: Self    Best call back number:[phone] (H)    Requested Prescriptions:   Requested Prescriptions     Pending Prescriptions Disp Refills   • PARoxetine (Paxil) 40 MG tablet 90 tablet 3     Sig: Take 1 tablet by mouth every night at bedtime.        Pharmacy where request should be sent: [facility] - [address] [phone] [facility] [phone]      Additional details provided by patient: PATIENT HAS ONE TABLET LEFT OF MEDICATION- PATIENT IS REQUESTING A REFILL UNTIL HER APPOINTMENT IN July     Does the patient have less than a 3 day supply:  [x] Yes  [] No    Marlen Connelly Rep   06/23/22 15:58 EDT

## 2022-07-12 ENCOUNTER — OFFICE VISIT (OUTPATIENT)
Dept: INTERNAL MEDICINE | Facility: CLINIC | Age: 22
End: 2022-07-12

## 2022-07-12 VITALS
DIASTOLIC BLOOD PRESSURE: 76 MMHG | BODY MASS INDEX: 22.98 KG/M2 | OXYGEN SATURATION: 100 % | SYSTOLIC BLOOD PRESSURE: 110 MMHG | WEIGHT: 143 LBS | HEIGHT: 66 IN | HEART RATE: 82 BPM | TEMPERATURE: 97.9 F

## 2022-07-12 DIAGNOSIS — F41.1 GENERALIZED ANXIETY DISORDER: Primary | ICD-10-CM

## 2022-07-12 DIAGNOSIS — F33.42 RECURRENT MAJOR DEPRESSIVE DISORDER, IN FULL REMISSION: ICD-10-CM

## 2022-07-12 PROCEDURE — 99213 OFFICE O/P EST LOW 20 MIN: CPT | Performed by: NURSE PRACTITIONER

## 2022-07-12 RX ORDER — PAROXETINE HYDROCHLORIDE 40 MG/1
40 TABLET, FILM COATED ORAL
Qty: 90 TABLET | Refills: 3 | Status: SHIPPED | OUTPATIENT
Start: 2022-07-12 | End: 2022-10-19 | Stop reason: DRUGHIGH

## 2022-07-12 NOTE — PROGRESS NOTES
"  Chief Complaint   Patient presents with   • Anxiety   • Depression       History of Present Illness    21 y.o.female presents for anxiety depression follow up.  General anxiety chronic onset years. Depression chronic onset years. Takes paxil; controls both. Feels good right now on current med/dose. Has recently went thru a breakup, so wants to stay on med. Would consider coming off at some time. Rarely has to take hydroxyzine with sleep.    Review of Systems   Constitutional: Negative for fatigue.   Psychiatric/Behavioral: Negative for self-injury, sleep disturbance and depressed mood. The patient is not nervous/anxious.          [facility]  The following portions of the patient's history were reviewed and updated as appropriate: allergies, current medications, past family history, past medical history, past social history, past surgical history and problem list.     Past Medical History:   Diagnosis Date   • Anxiety    • Depression       No Known Allergies   Social History     Tobacco Use   • Smoking status: Never Smoker   • Smokeless tobacco: Never Used   Vaping Use   • Vaping Use: Never used   Substance Use Topics   • Alcohol use: Yes   • Drug use: No     Past Surgical History:   Procedure Laterality Date   • WISDOM TOOTH EXTRACTION        Family History   Problem Relation Age of Onset   • Mental illness Mother    • Mental illness Maternal Grandmother    • Heart attack Maternal Grandfather            Current Outpatient Medications:   •  atomoxetine (STRATTERA) 80 MG capsule, Take 1 capsule by mouth Daily., Disp: 60 capsule, Rfl: 0  •  hydrOXYzine (ATARAX) 25 MG tablet, Take 1 tablet by mouth 3 (Three) Times a Day As Needed for Anxiety., Disp: 30 tablet, Rfl: 11  •  PARoxetine (Paxil) 40 MG tablet, Take 1 tablet by mouth every night at bedtime., Disp: 20 tablet, Rfl: 0    VITALS:  /76   Pulse 82   Temp 97.9 °F (36.6 °C)   Ht 166.4 cm (65.5\")   Wt 64.9 kg (143 lb)   LMP 07/10/2022   SpO2 100%   BMI 23.43 " kg/m²     Physical Exam  Vitals reviewed.   HENT:      Mouth/Throat:      Mouth: Mucous membranes are moist.   Eyes:      Pupils: Pupils are equal, round, and reactive to light.   Cardiovascular:      Rate and Rhythm: Normal rate and regular rhythm.      Heart sounds: Normal heart sounds.   Pulmonary:      Effort: Pulmonary effort is normal. No respiratory distress.      Breath sounds: Normal breath sounds.   Skin:     General: Skin is warm and dry.   Neurological:      General: No focal deficit present.      Mental Status: She is alert and oriented to person, place, and time.      Motor: No weakness.      Gait: Gait normal.   Psychiatric:         Mood and Affect: Mood normal.         Behavior: Behavior normal.         Result Review :              Assessment and Plan    Diagnoses and all orders for this visit:    1. Generalized anxiety disorder (Primary)  -     PARoxetine (Paxil) 40 MG tablet; Take 1 tablet by mouth every night at bedtime.  Dispense: 90 tablet; Refill: 3    2. Recurrent major depressive disorder, in full remission (HCC)  Comments:  cont paxil   Orders:  -     PARoxetine (Paxil) 40 MG tablet; Take 1 tablet by mouth every night at bedtime.  Dispense: 90 tablet; Refill: 3        I discussed the patients findings and my recommendations with patient.  Patient was encouraged to keep me informed of any acute changes, lack of improvement, or any new concerning symptoms.  Patient voiced understanding of all instructions and denied further questions.      Follow Up   Return in about 1 year (around 7/12/2023), or if symptoms worsen or fail to improve, for Annual.      Electronically signed by:    JEANNINE Elias  07/12/2022

## 2022-07-15 DIAGNOSIS — F41.1 GENERALIZED ANXIETY DISORDER: ICD-10-CM

## 2022-07-15 DIAGNOSIS — F33.42 RECURRENT MAJOR DEPRESSIVE DISORDER, IN FULL REMISSION: ICD-10-CM

## 2022-07-15 RX ORDER — PAROXETINE HYDROCHLORIDE 40 MG/1
TABLET, FILM COATED ORAL
Qty: 20 TABLET | Refills: 0 | OUTPATIENT
Start: 2022-07-15

## 2022-10-19 ENCOUNTER — TELEMEDICINE (OUTPATIENT)
Dept: INTERNAL MEDICINE | Facility: CLINIC | Age: 22
End: 2022-10-19

## 2022-10-19 DIAGNOSIS — F41.1 GENERALIZED ANXIETY DISORDER: Primary | ICD-10-CM

## 2022-10-19 DIAGNOSIS — F33.42 RECURRENT MAJOR DEPRESSIVE DISORDER, IN FULL REMISSION: ICD-10-CM

## 2022-10-19 DIAGNOSIS — M25.559 HIP PAIN: ICD-10-CM

## 2022-10-19 RX ORDER — PAROXETINE HYDROCHLORIDE 20 MG/1
20 TABLET, FILM COATED ORAL NIGHTLY
Qty: 30 TABLET | Refills: 2 | Status: SHIPPED | OUTPATIENT
Start: 2022-10-19 | End: 2022-12-15 | Stop reason: SDUPTHER

## 2022-10-19 RX ORDER — METHYLPREDNISOLONE ACETATE 40 MG/ML
40 INJECTION, SUSPENSION INTRA-ARTICULAR; INTRALESIONAL; INTRAMUSCULAR; SOFT TISSUE ONCE
Status: COMPLETED | OUTPATIENT
Start: 2022-10-19 | End: 2022-10-19

## 2022-10-19 RX ADMIN — METHYLPREDNISOLONE ACETATE 40 MG: 40 INJECTION, SUSPENSION INTRA-ARTICULAR; INTRALESIONAL; INTRAMUSCULAR; SOFT TISSUE at 17:06

## 2022-10-19 NOTE — PROGRESS NOTES
Chief Complaint   Patient presents with   • Anxiety     Depression follow up     The use of a video visit has been reviewed with the patient and verbal informed consent has been obtained.      History of Present Illness    21 y.o.female presents for anxiety depression follow up.  Chronic anxiety depression onset years intermittent. Has been on paxil. Has been controlled for several months now and would like to try to decrease dose or come off paxil. Has been doing half tab of the 40mg paxil for about 3 days. No w/d side effects so far. Doesn't have any more of the 40mg tabs.    Review of Systems   Constitutional: Negative for fatigue.   Psychiatric/Behavioral: Negative for self-injury, suicidal ideas, depressed mood and stress. The patient is not nervous/anxious.          [facility]  The following portions of the patient's history were reviewed and updated as appropriate: allergies, current medications, past family history, past medical history, past social history, past surgical history and problem list.     Past Medical History:   Diagnosis Date   • Anxiety    • Depression       No Known Allergies   Social History     Tobacco Use   • Smoking status: Never   • Smokeless tobacco: Never   Vaping Use   • Vaping Use: Never used   Substance Use Topics   • Alcohol use: Yes   • Drug use: No     Past Surgical History:   Procedure Laterality Date   • WISDOM TOOTH EXTRACTION        Family History   Problem Relation Age of Onset   • Mental illness Mother    • Mental illness Maternal Grandmother    • Heart attack Maternal Grandfather            Current Outpatient Medications:   •  hydrOXYzine (ATARAX) 25 MG tablet, Take 1 tablet by mouth 3 (Three) Times a Day As Needed for Anxiety., Disp: 30 tablet, Rfl: 11  •  PARoxetine (Paxil) 40 MG tablet, Take 1 tablet by mouth every night at bedtime., Disp: 90 tablet, Rfl: 3      Physical Exam  Pulmonary:      Effort: Pulmonary effort is normal.   Neurological:      Mental Status: She is  alert.   Psychiatric:         Mood and Affect: Mood normal.         Behavior: Behavior normal.         Result Review :            Assessment and Plan    Diagnoses and all orders for this visit:    1. Generalized anxiety disorder (Primary)  -     PARoxetine (Paxil) 20 MG tablet; Take 1 tablet by mouth Every Night.  Dispense: 30 tablet; Refill: 2    2. Recurrent major depressive disorder, in full remission (HCC)  -     PARoxetine (Paxil) 20 MG tablet; Take 1 tablet by mouth Every Night.  Dispense: 30 tablet; Refill: 2    ok to decrease to 20mg dose daily for about 2 weeks. If tolerating ok, will cut in half for 10mg daily again for couple weeks, then every other day for about a week then stop. Reviewed w/d symptoms such as headache, dizziness, nausea, emotion lability. If occurs then would continue on that dose for about 4-6 weeks until stable again without any w/d symptoms before decreasing dose again. Any worsening depression symptoms to le me know.    I discussed the patients findings and my recommendations with patient.  Patient was encouraged to keep me informed of any acute changes, lack of improvement, or any new concerning symptoms.  Patient voiced understanding of all instructions and denied further questions.      Follow Up   Return if symptoms worsen or fail to improve.      Electronically signed by:    JEANNINE Elias  10/19/2022

## 2022-12-14 ENCOUNTER — FLU SHOT (OUTPATIENT)
Dept: INTERNAL MEDICINE | Facility: CLINIC | Age: 22
End: 2022-12-14

## 2022-12-14 DIAGNOSIS — Z23 NEED FOR INFLUENZA VACCINATION: Primary | ICD-10-CM

## 2022-12-14 PROCEDURE — 90686 IIV4 VACC NO PRSV 0.5 ML IM: CPT | Performed by: NURSE PRACTITIONER

## 2022-12-14 PROCEDURE — 90471 IMMUNIZATION ADMIN: CPT | Performed by: NURSE PRACTITIONER

## 2022-12-15 ENCOUNTER — OFFICE VISIT (OUTPATIENT)
Dept: INTERNAL MEDICINE | Facility: CLINIC | Age: 22
End: 2022-12-15

## 2022-12-15 VITALS
HEIGHT: 66 IN | SYSTOLIC BLOOD PRESSURE: 110 MMHG | WEIGHT: 140 LBS | DIASTOLIC BLOOD PRESSURE: 62 MMHG | BODY MASS INDEX: 22.5 KG/M2 | TEMPERATURE: 97 F | HEART RATE: 69 BPM

## 2022-12-15 DIAGNOSIS — F41.1 GENERALIZED ANXIETY DISORDER: ICD-10-CM

## 2022-12-15 DIAGNOSIS — Z76.89 ENCOUNTER TO ESTABLISH CARE: Primary | ICD-10-CM

## 2022-12-15 DIAGNOSIS — Z23 NEED FOR VACCINATION: ICD-10-CM

## 2022-12-15 DIAGNOSIS — F33.42 RECURRENT MAJOR DEPRESSIVE DISORDER, IN FULL REMISSION: ICD-10-CM

## 2022-12-15 PROCEDURE — 0124A PR ADM SARSCOV2 30MCG/0.3ML BST: CPT | Performed by: NURSE PRACTITIONER

## 2022-12-15 PROCEDURE — 91312 COVID-19 (PFIZER) BIVALENT BOOSTER 12+YRS: CPT | Performed by: NURSE PRACTITIONER

## 2022-12-15 PROCEDURE — 99213 OFFICE O/P EST LOW 20 MIN: CPT | Performed by: NURSE PRACTITIONER

## 2022-12-15 RX ORDER — PAROXETINE HYDROCHLORIDE 20 MG/1
30 TABLET, FILM COATED ORAL NIGHTLY
Qty: 135 TABLET | Refills: 1 | Status: SHIPPED | OUTPATIENT
Start: 2022-12-15 | End: 2023-04-03

## 2022-12-15 NOTE — PROGRESS NOTES
Office Note     Name: Penelope Chavez    : 2000     MRN: [id number]     Chief Complaint  Establish Care, Med Refill, and Anxiety    Subjective     History of Present Illness:  Penelope Chavez is a 22 y.o. female who presents today to establish care with a new provider and to discuss anxiety medication.  Patient was previously being seen by JEANNINE Cruz.  She previously had been trying to wean herself off of her Paxil.  She had gone from 40 mg down to 20 mg.  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  She is concerned about having increased anxiety as she will be living alone while abroad.  She also discussed the possibility of having underlying ADHD.  She is interested in a behavioral health referral for evaluation for ADHD when she returns from [place].  She received her flu vaccine yesterday.  She received her COVID booster today.  She denies any acute concerns or complaints at this time.  Had a pleasant visit with the patient today.    Review of Systems   Constitutional: Negative.    HENT: Negative.    Respiratory: Negative.    Cardiovascular: Negative.    Gastrointestinal: Negative.    Genitourinary: Negative.    Psychiatric/Behavioral: Positive for dysphoric mood. The patient is nervous/anxious.        Past Medical History:   Diagnosis Date   • Anxiety    • Depression        Past Surgical History:   Procedure Laterality Date   • WISDOM TOOTH EXTRACTION         Social History     Socioeconomic History   • Marital status: Single   Tobacco Use   • Smoking status: Never   • Smokeless tobacco: Never   Vaping Use   • Vaping Use: Never used   Substance and Sexual Activity   • Alcohol use: Yes   • Drug use: No   • Sexual activity: Yes     Partners: Male     Birth control/protection: Condom         Current Outpatient Medications:   •  hydrOXYzine (ATARAX) 25 MG tablet, Take 1 tablet by mouth 3  "(Three) Times a Day As Needed for Anxiety., Disp: 30 tablet, Rfl: 11  •  PARoxetine (Paxil) 20 MG tablet, Take 1.5 tablets by mouth Every Night for 90 days., Disp: 135 tablet, Rfl: 1    Objective     Vital Signs  /62   Pulse 69   Temp 97 °F (36.1 °C)   Ht 166.4 cm (65.5\")   Wt 63.5 kg (140 lb)   BMI 22.94 kg/m²   Estimated body mass index is 22.94 kg/m² as calculated from the following:    Height as of this encounter: 166.4 cm (65.5\").    Weight as of this encounter: 63.5 kg (140 lb).    BMI is within normal parameters. No other follow-up for BMI required.      Physical Exam  Constitutional:       Appearance: Normal appearance. She is normal weight.   HENT:      Head: Normocephalic and atraumatic.      Nose: Nose normal.   Eyes:      Extraocular Movements: Extraocular movements intact.      Conjunctiva/sclera: Conjunctivae normal.      Pupils: Pupils are equal, round, and reactive to light.   Cardiovascular:      Rate and Rhythm: Normal rate and regular rhythm.      Heart sounds: Normal heart sounds.   Pulmonary:      Effort: Pulmonary effort is normal. No respiratory distress.      Breath sounds: Normal breath sounds.   Musculoskeletal:         General: Normal range of motion.      Cervical back: Neck supple.   Skin:     General: Skin is warm and dry.   Neurological:      General: No focal deficit present.      Mental Status: She is alert and oriented to person, place, and time. Mental status is at baseline.   Psychiatric:         Mood and Affect: Mood normal.         Behavior: Behavior normal.         Thought Content: Thought content normal.         Judgment: Judgment normal.          Assessment and Plan     Diagnoses and all orders for this visit:    1. Encounter to establish care (Primary)    2. Generalized anxiety disorder  -     PARoxetine (Paxil) 20 MG tablet; Take 1.5 tablets by mouth Every Night for 90 days.  Dispense: 135 tablet; Refill: 1    3. Recurrent major depressive disorder, in full " remission (HCC)  -     PARoxetine (Paxil) 20 MG tablet; Take 1.5 tablets by mouth Every Night for 90 days.  Dispense: 135 tablet; Refill: 1    4. Need for vaccination  -     COVID-19 Bivalent Booster (Pfizer) 12+yrs    Plan:  Patient presents to establish care with a new provider.  Patient is up-to-date on her flu vaccine.  Patient received her COVID booster today.  Will increase Paxil to 30 mg once daily.  Will give a 90-day supply as she will be in [facility] for 2 months.  Will place a behavioral health referral once she returns from [facility] to be evaluated for ADHD.  Patient will follow-up in about 3 months for a recheck and for annual physical exam.    Follow Up  Return in about 3 months (around 3/15/2023) for Recheck, Annual.    JEANNINE Coreas    Part of this note may be an electronic transcription/translation of spoken language to printed text using the Dragon Dictation System.

## 2023-04-03 ENCOUNTER — OFFICE VISIT (OUTPATIENT)
Dept: INTERNAL MEDICINE | Facility: CLINIC | Age: 23
End: 2023-04-03
Payer: COMMERCIAL

## 2023-04-03 VITALS
HEART RATE: 74 BPM | TEMPERATURE: 97.8 F | OXYGEN SATURATION: 100 % | SYSTOLIC BLOOD PRESSURE: 114 MMHG | DIASTOLIC BLOOD PRESSURE: 70 MMHG | BODY MASS INDEX: 23.14 KG/M2 | WEIGHT: 144 LBS | HEIGHT: 66 IN

## 2023-04-03 DIAGNOSIS — Z86.59 HISTORY OF ADHD: ICD-10-CM

## 2023-04-03 DIAGNOSIS — Z09 FOLLOW-UP EXAM: Primary | ICD-10-CM

## 2023-04-03 DIAGNOSIS — F41.1 GAD (GENERALIZED ANXIETY DISORDER): ICD-10-CM

## 2023-04-03 RX ORDER — PAROXETINE HYDROCHLORIDE 20 MG/1
30 TABLET, FILM COATED ORAL DAILY
COMMUNITY
Start: 2022-12-15 | End: 2023-04-03 | Stop reason: SDUPTHER

## 2023-04-03 RX ORDER — PAROXETINE HYDROCHLORIDE 20 MG/1
20 TABLET, FILM COATED ORAL DAILY
Qty: 30 TABLET | Refills: 1 | Status: SHIPPED | OUTPATIENT
Start: 2023-04-03

## 2023-04-03 NOTE — PROGRESS NOTES
"    Office Note     Name: Penelope Chavez    : 2000     MRN: [id number]     Chief Complaint  ADHD    Subjective     History of Present Illness:  Penelope Chavez is a 22 y.o. female who presents today for a routine follow-up visit.  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  Patient left her experience and has applied for employment next year.  Patient would like to revisit a referral to behavioral health for evaluation for ADHD.  Patient does have a history of depression but reports antidepressants make her feel \"numb\".  She was on 30 mg of Paxil daily until about 1-1/2 weeks ago when she started weaning herself down.  She is currently taking 20 mg daily.  She would like to completely wean herself off.  She is agreeable to return in the summer for an annual physical with labs.  She wants to get through this semester of school first.  She denies further complaints or concerns at this time.  Had a pleasant visit with the patient today.    Review of Systems   Psychiatric/Behavioral: Positive for decreased concentration. The patient is hyperactive.        Past Medical History:   Diagnosis Date   • Anxiety    • Depression        Past Surgical History:   Procedure Laterality Date   • WISDOM TOOTH EXTRACTION         Social History     Socioeconomic History   • Marital status: Single   Tobacco Use   • Smoking status: Never   • Smokeless tobacco: Never   Vaping Use   • Vaping Use: Never used   Substance and Sexual Activity   • Alcohol use: Yes   • Drug use: No   • Sexual activity: Yes     Partners: Male     Birth control/protection: Condom         Current Outpatient Medications:   •  hydrOXYzine (ATARAX) 25 MG tablet, Take 1 tablet by mouth 3 (Three) Times a Day As Needed for Anxiety., Disp: 30 tablet, Rfl: 11  •  PARoxetine (PAXIL) 20 MG tablet, Take 1 tablet by mouth Daily., Disp: 30 tablet, Rfl: 1    Objective     Vital Signs  /70   Pulse 74   Temp 97.8 °F (36.6 °C)   Ht 166.4 " "cm (65.5\")   Wt 65.3 kg (144 lb)   SpO2 100%   BMI 23.60 kg/m²   Estimated body mass index is 23.6 kg/m² as calculated from the following:    Height as of this encounter: 166.4 cm (65.5\").    Weight as of this encounter: 65.3 kg (144 lb).    BMI is within normal parameters. No other follow-up for BMI required.      Physical Exam  Constitutional:       Appearance: Normal appearance. She is normal weight.   HENT:      Head: Normocephalic and atraumatic.      Nose: Nose normal.   Eyes:      Extraocular Movements: Extraocular movements intact.      Conjunctiva/sclera: Conjunctivae normal.      Pupils: Pupils are equal, round, and reactive to light.   Cardiovascular:      Rate and Rhythm: Normal rate.   Pulmonary:      Effort: Pulmonary effort is normal. No respiratory distress.   Musculoskeletal:         General: Normal range of motion.      Cervical back: Neck supple.   Skin:     General: Skin is warm and dry.   Neurological:      General: No focal deficit present.      Mental Status: She is alert and oriented to person, place, and time. Mental status is at baseline.   Psychiatric:         Mood and Affect: Mood normal.         Behavior: Behavior normal.         Thought Content: Thought content normal.         Judgment: Judgment normal.          Assessment and Plan     Diagnoses and all orders for this visit:    1. Follow-up exam (Primary)    2. MARIO (generalized anxiety disorder)  -     PARoxetine (PAXIL) 20 MG tablet; Take 1 tablet by mouth Daily.  Dispense: 30 tablet; Refill: 1  -     Ambulatory Referral to Behavioral Health    3. History of ADHD  -     Ambulatory Referral to Behavioral Health    Plan:  Patient presents for routine follow-up.  Patient may continue to wean herself off of Paxil if desired.  Would recommend reducing down to 10 mg daily and then 10 mg every other day until she is able to safely wean.  Will place a behavioral health referral for evaluation of ADHD.  Patient is agreeable to return to the " clinic in 3 months for annual physical exam with fasting labs.  Return to clinic sooner if needed.    Follow Up  Return in about 3 months (around 7/3/2023) for Annual.    JEANNINE Coreas    Part of this note may be an electronic transcription/translation of spoken language to printed text using the Dragon Dictation System.

## 2023-06-15 DIAGNOSIS — F41.1 GAD (GENERALIZED ANXIETY DISORDER): Primary | ICD-10-CM

## 2023-06-15 DIAGNOSIS — Z86.59 HISTORY OF ADHD: ICD-10-CM

## 2025-08-06 ENCOUNTER — TELEPHONE (OUTPATIENT)
Dept: INTERNAL MEDICINE | Facility: CLINIC | Age: 25
End: 2025-08-06
Payer: COMMERCIAL